# Patient Record
Sex: MALE | Race: WHITE
[De-identification: names, ages, dates, MRNs, and addresses within clinical notes are randomized per-mention and may not be internally consistent; named-entity substitution may affect disease eponyms.]

---

## 2021-08-02 ENCOUNTER — HOSPITAL ENCOUNTER (EMERGENCY)
Dept: HOSPITAL 95 - ER | Age: 81
LOS: 2 days | Discharge: LEFT BEFORE DISCHARGE | End: 2021-08-04
Payer: MEDICARE

## 2021-08-02 DIAGNOSIS — Z53.21: Primary | ICD-10-CM

## 2021-08-03 ENCOUNTER — HOSPITAL ENCOUNTER (OUTPATIENT)
Dept: HOSPITAL 95 - LAB | Age: 81
Discharge: HOME | End: 2021-08-03
Attending: PODIATRIST
Payer: MEDICARE

## 2021-08-03 DIAGNOSIS — L97.509: Primary | ICD-10-CM

## 2021-08-16 ENCOUNTER — HOSPITAL ENCOUNTER (OUTPATIENT)
Dept: HOSPITAL 95 - LAB | Age: 81
Discharge: HOME | End: 2021-08-16
Attending: PODIATRIST
Payer: MEDICARE

## 2021-08-16 DIAGNOSIS — M86.172: Primary | ICD-10-CM

## 2021-08-16 DIAGNOSIS — L03.032: ICD-10-CM

## 2021-08-16 DIAGNOSIS — M86.9: Primary | ICD-10-CM

## 2021-08-16 DIAGNOSIS — L98.499: ICD-10-CM

## 2021-09-30 ENCOUNTER — HOSPITAL ENCOUNTER (OUTPATIENT)
Dept: HOSPITAL 95 - LAB SHORT | Age: 81
Discharge: HOME | End: 2021-09-30
Attending: PODIATRIST
Payer: MEDICARE

## 2021-09-30 DIAGNOSIS — T81.31XD: ICD-10-CM

## 2021-09-30 DIAGNOSIS — Z48.89: Primary | ICD-10-CM

## 2021-09-30 DIAGNOSIS — R20.0: ICD-10-CM

## 2021-09-30 DIAGNOSIS — E11.621: ICD-10-CM

## 2021-09-30 DIAGNOSIS — L97.509: ICD-10-CM

## 2021-10-22 ENCOUNTER — HOSPITAL ENCOUNTER (OUTPATIENT)
Dept: HOSPITAL 95 - LAB SHORT | Age: 81
Discharge: HOME | End: 2021-10-22
Attending: PODIATRIST
Payer: MEDICARE

## 2021-10-22 DIAGNOSIS — Z48.89: Primary | ICD-10-CM

## 2021-11-01 ENCOUNTER — HOSPITAL ENCOUNTER (OUTPATIENT)
Dept: HOSPITAL 95 - LAB | Age: 81
End: 2021-11-01
Attending: PODIATRIST
Payer: MEDICARE

## 2021-11-01 ENCOUNTER — HOSPITAL ENCOUNTER (EMERGENCY)
Dept: HOSPITAL 95 - ER | Age: 81
Discharge: HOME | End: 2021-11-01
Payer: MEDICARE

## 2021-11-01 VITALS — HEIGHT: 76 IN | BODY MASS INDEX: 23.52 KG/M2 | WEIGHT: 193.12 LBS

## 2021-11-01 DIAGNOSIS — I48.91: ICD-10-CM

## 2021-11-01 DIAGNOSIS — M86.9: ICD-10-CM

## 2021-11-01 DIAGNOSIS — E11.69: Primary | ICD-10-CM

## 2021-11-01 DIAGNOSIS — Z48.817: Primary | ICD-10-CM

## 2021-11-01 LAB
ALBUMIN SERPL BCP-MCNC: 2.7 G/DL (ref 3.4–5)
ALBUMIN/GLOB SERPL: 0.6 {RATIO} (ref 0.8–1.8)
ALT SERPL W P-5'-P-CCNC: 16 U/L (ref 12–78)
ANION GAP SERPL CALCULATED.4IONS-SCNC: 3 MMOL/L (ref 6–16)
AST SERPL W P-5'-P-CCNC: 23 U/L (ref 12–37)
BASOPHILS # BLD AUTO: 0.11 K/MM3 (ref 0–0.23)
BASOPHILS NFR BLD AUTO: 1 % (ref 0–2)
BILIRUB SERPL-MCNC: 0.6 MG/DL (ref 0.1–1)
BUN SERPL-MCNC: 20 MG/DL (ref 8–24)
CALCIUM SERPL-MCNC: 9.1 MG/DL (ref 8.5–10.1)
CHLORIDE SERPL-SCNC: 105 MMOL/L (ref 98–108)
CO2 SERPL-SCNC: 30 MMOL/L (ref 21–32)
CREAT SERPL-MCNC: 0.77 MG/DL (ref 0.6–1.2)
DEPRECATED RDW RBC AUTO: 47.3 FL (ref 35.1–46.3)
EOSINOPHIL # BLD AUTO: 0.11 K/MM3 (ref 0–0.68)
EOSINOPHIL NFR BLD AUTO: 1 % (ref 0–6)
ERYTHROCYTE [DISTWIDTH] IN BLOOD BY AUTOMATED COUNT: 14.7 % (ref 11.7–14.2)
GLOBULIN SER CALC-MCNC: 4.4 G/DL (ref 2.2–4)
GLUCOSE SERPL-MCNC: 129 MG/DL (ref 70–99)
HCT VFR BLD AUTO: 40.4 % (ref 37–53)
HGB BLD-MCNC: 12.6 G/DL (ref 13.5–17.5)
IMM GRANULOCYTES # BLD AUTO: 0.04 K/MM3 (ref 0–0.1)
IMM GRANULOCYTES NFR BLD AUTO: 0 % (ref 0–1)
LYMPHOCYTES # BLD AUTO: 1.1 K/MM3 (ref 0.84–5.2)
LYMPHOCYTES NFR BLD AUTO: 10 % (ref 21–46)
MCHC RBC AUTO-ENTMCNC: 31.2 G/DL (ref 31.5–36.5)
MCV RBC AUTO: 87 FL (ref 80–100)
MONOCYTES # BLD AUTO: 1.08 K/MM3 (ref 0.16–1.47)
MONOCYTES NFR BLD AUTO: 10 % (ref 4–13)
NEUTROPHILS # BLD AUTO: 8.93 K/MM3 (ref 1.96–9.15)
NEUTROPHILS NFR BLD AUTO: 78 % (ref 41–73)
NRBC # BLD AUTO: 0 K/MM3 (ref 0–0.02)
NRBC BLD AUTO-RTO: 0 /100 WBC (ref 0–0.2)
PLATELET # BLD AUTO: 308 K/MM3 (ref 150–400)
POTASSIUM SERPL-SCNC: 4.3 MMOL/L (ref 3.5–5.5)
PROT SERPL-MCNC: 7.1 G/DL (ref 6.4–8.2)
SODIUM SERPL-SCNC: 138 MMOL/L (ref 136–145)

## 2021-11-01 PROCEDURE — A9270 NON-COVERED ITEM OR SERVICE: HCPCS

## 2021-11-02 ENCOUNTER — HOSPITAL ENCOUNTER (OUTPATIENT)
Dept: HOSPITAL 95 - ATC | Age: 81
Discharge: HOME | End: 2021-11-02
Attending: FAMILY MEDICINE
Payer: MEDICARE

## 2021-11-02 DIAGNOSIS — E11.69: Primary | ICD-10-CM

## 2021-11-02 DIAGNOSIS — I10: ICD-10-CM

## 2021-11-02 DIAGNOSIS — Z79.899: ICD-10-CM

## 2021-11-02 DIAGNOSIS — M86.8X7: ICD-10-CM

## 2021-11-02 PROCEDURE — C1751 CATH, INF, PER/CENT/MIDLINE: HCPCS

## 2021-11-02 NOTE — NUR
F/U CHEST X-RAY NEEDED:
CHEST X-RAY WAS PERFORMED PER PROTOCOL FOR CONFIRMATION OF PICC PLACEMENT IN
PT W/ AFIB.  PER RADIOLOGIST THE PICC  TIP PROJECTED OVER THE INF R ATRIUM.
REPORT WAS RECEIVED FROM DEISI BRANDON THAT THE RADIOLOGIST HAD READ THE CHEST
XRAY AND TO PULL THE CATHETER BACK 3 CM.  THERE WAS NO MENTION OF GETTING A
REPEAT CHEST XRAY AT THAT TIME.
THIS RN PULLED THE CATHETER BACK 3 CM FOR A TOTAL OF 6 CM EXPOSED. PT WAS THEN
DC'D HOME.
WHEN THE XRAY REPORT WAS AVAILABE, THIS RN NOTICED THAT THE RADIOLOGIST WROTE
"SUBSEQUENT IMAGING TO VERIFY PLACEMENT".  PT WAS CONTACTED, HE SAID HE WOULD
RETURN TO THE HOSPITAL FOR A PREVIOUSLY SCHEDULED APPT, SO HE COULD GET
THE X-RAY THEN.  THE PT LATER CALLED THIS RN TO SAY THAT HIS FOOT WAS SORE
AND THAT HE HAD CANCELLED HIS OTHER APPT SO HE WOULD NOT BE COMING BACK FOR
THE X-RAY.  PT WAS TOLD THAT IT WAS IMPORTANT TO GET THE XRAY, HE VERBALIZED
UNDERSTANDING AND SAID HE WOULD RETURN TOMORROW AM FOR HIS INFUSION AND XRAY
AT THAT TIME.
THE PICC WAS NOT USED FOR INFUSION BECAUSE HE HAD AN IV WHICH WAS USED.

## 2021-11-03 ENCOUNTER — HOSPITAL ENCOUNTER (OUTPATIENT)
Dept: HOSPITAL 95 - ATC | Age: 81
Discharge: HOME | End: 2021-11-03
Attending: FAMILY MEDICINE
Payer: MEDICARE

## 2021-11-03 DIAGNOSIS — E11.69: Primary | ICD-10-CM

## 2021-11-03 DIAGNOSIS — M86.9: ICD-10-CM

## 2021-11-03 DIAGNOSIS — I48.91: ICD-10-CM

## 2021-11-03 LAB
CREAT SERPL-MCNC: 0.74 MG/DL (ref 0.6–1.2)
GENTAMICIN TROUGH SERPL-MCNC: 1.4 UG/ML (ref 0–1.9)
VANCOMYCIN TROUGH SERPL-MCNC: 6.9 UG/ML (ref 5–10)

## 2021-11-04 ENCOUNTER — HOSPITAL ENCOUNTER (OUTPATIENT)
Dept: HOSPITAL 95 - ATC | Age: 81
Discharge: HOME | End: 2021-11-04
Attending: FAMILY MEDICINE
Payer: MEDICARE

## 2021-11-04 DIAGNOSIS — E11.69: Primary | ICD-10-CM

## 2021-11-04 DIAGNOSIS — I48.91: ICD-10-CM

## 2021-11-04 DIAGNOSIS — M86.9: ICD-10-CM

## 2021-11-04 LAB — GENTAMICIN TROUGH SERPL-MCNC: 0.2 UG/ML (ref 0–1.9)

## 2021-11-05 ENCOUNTER — HOSPITAL ENCOUNTER (OUTPATIENT)
Dept: HOSPITAL 95 - ATC | Age: 81
Discharge: HOME | End: 2021-11-05
Attending: FAMILY MEDICINE
Payer: MEDICARE

## 2021-11-05 DIAGNOSIS — Z89.421: ICD-10-CM

## 2021-11-05 DIAGNOSIS — E11.69: Primary | ICD-10-CM

## 2021-11-05 DIAGNOSIS — Z79.899: ICD-10-CM

## 2021-11-05 DIAGNOSIS — M86.8X7: ICD-10-CM

## 2021-11-06 ENCOUNTER — HOSPITAL ENCOUNTER (OUTPATIENT)
Dept: HOSPITAL 95 - ATC | Age: 81
Discharge: HOME | End: 2021-11-06
Attending: FAMILY MEDICINE
Payer: MEDICARE

## 2021-11-06 DIAGNOSIS — I48.91: ICD-10-CM

## 2021-11-06 DIAGNOSIS — E11.69: Primary | ICD-10-CM

## 2021-11-06 DIAGNOSIS — M86.9: ICD-10-CM

## 2021-11-06 LAB
CREAT SERPL-MCNC: 0.75 MG/DL (ref 0.6–1.2)
GENTAMICIN TROUGH SERPL-MCNC: 0.4 UG/ML (ref 0–1.9)
VANCOMYCIN TROUGH SERPL-MCNC: 14.3 UG/ML (ref 5–10)

## 2021-11-07 ENCOUNTER — HOSPITAL ENCOUNTER (OUTPATIENT)
Dept: HOSPITAL 95 - ATC | Age: 81
Discharge: HOME | End: 2021-11-07
Attending: FAMILY MEDICINE
Payer: MEDICARE

## 2021-11-07 DIAGNOSIS — M86.9: ICD-10-CM

## 2021-11-07 DIAGNOSIS — I48.91: ICD-10-CM

## 2021-11-07 DIAGNOSIS — E11.69: Primary | ICD-10-CM

## 2021-11-08 ENCOUNTER — HOSPITAL ENCOUNTER (OUTPATIENT)
Dept: HOSPITAL 95 - ATC | Age: 81
Discharge: HOME | End: 2021-11-08
Attending: FAMILY MEDICINE
Payer: MEDICARE

## 2021-11-08 DIAGNOSIS — E11.69: Primary | ICD-10-CM

## 2021-11-08 DIAGNOSIS — Z89.421: ICD-10-CM

## 2021-11-08 DIAGNOSIS — M86.8X7: ICD-10-CM

## 2021-11-08 DIAGNOSIS — Z79.899: ICD-10-CM

## 2021-11-09 ENCOUNTER — HOSPITAL ENCOUNTER (OUTPATIENT)
Dept: HOSPITAL 95 - ATC | Age: 81
Discharge: HOME | End: 2021-11-09
Attending: FAMILY MEDICINE
Payer: MEDICARE

## 2021-11-09 DIAGNOSIS — E11.69: Primary | ICD-10-CM

## 2021-11-09 DIAGNOSIS — Z79.899: ICD-10-CM

## 2021-11-09 DIAGNOSIS — M86.8X7: ICD-10-CM

## 2021-11-10 ENCOUNTER — HOSPITAL ENCOUNTER (OUTPATIENT)
Dept: HOSPITAL 95 - ATC | Age: 81
Discharge: HOME | End: 2021-11-10
Attending: FAMILY MEDICINE
Payer: MEDICARE

## 2021-11-10 DIAGNOSIS — I48.91: ICD-10-CM

## 2021-11-10 DIAGNOSIS — M86.9: ICD-10-CM

## 2021-11-10 DIAGNOSIS — Z79.84: ICD-10-CM

## 2021-11-10 DIAGNOSIS — E11.69: Primary | ICD-10-CM

## 2021-11-10 LAB
CREAT SERPL-MCNC: 0.83 MG/DL (ref 0.6–1.2)
GENTAMICIN TROUGH SERPL-MCNC: 0.4 UG/ML (ref 0–1.9)
VANCOMYCIN TROUGH SERPL-MCNC: 18.1 UG/ML (ref 5–10)

## 2021-11-11 ENCOUNTER — HOSPITAL ENCOUNTER (OUTPATIENT)
Dept: HOSPITAL 95 - ATC | Age: 81
Discharge: HOME | End: 2021-11-11
Attending: FAMILY MEDICINE
Payer: MEDICARE

## 2021-11-11 DIAGNOSIS — I48.91: ICD-10-CM

## 2021-11-11 DIAGNOSIS — M86.9: ICD-10-CM

## 2021-11-11 DIAGNOSIS — E11.69: Primary | ICD-10-CM

## 2021-11-14 ENCOUNTER — HOSPITAL ENCOUNTER (OUTPATIENT)
Dept: HOSPITAL 95 - ATC | Age: 81
Discharge: HOME | End: 2021-11-14
Attending: FAMILY MEDICINE
Payer: MEDICARE

## 2021-11-14 DIAGNOSIS — E11.622: ICD-10-CM

## 2021-11-14 DIAGNOSIS — I83.229: ICD-10-CM

## 2021-11-14 DIAGNOSIS — Z01.810: ICD-10-CM

## 2021-11-14 DIAGNOSIS — I70.213: ICD-10-CM

## 2021-11-14 DIAGNOSIS — M86.9: ICD-10-CM

## 2021-11-14 DIAGNOSIS — E11.69: Primary | ICD-10-CM

## 2021-11-14 DIAGNOSIS — L97.919: ICD-10-CM

## 2021-11-14 DIAGNOSIS — L97.929: ICD-10-CM

## 2021-11-14 DIAGNOSIS — I63.219: ICD-10-CM

## 2021-11-15 ENCOUNTER — HOSPITAL ENCOUNTER (OUTPATIENT)
Dept: HOSPITAL 95 - MHTC | Age: 81
Discharge: HOME | End: 2021-11-15
Attending: RADIOLOGY
Payer: MEDICARE

## 2021-11-15 VITALS — HEIGHT: 76 IN | WEIGHT: 197.98 LBS | BODY MASS INDEX: 24.11 KG/M2

## 2021-11-15 DIAGNOSIS — J44.9: ICD-10-CM

## 2021-11-15 DIAGNOSIS — L97.919: ICD-10-CM

## 2021-11-15 DIAGNOSIS — I48.91: ICD-10-CM

## 2021-11-15 DIAGNOSIS — I50.9: ICD-10-CM

## 2021-11-15 DIAGNOSIS — E11.51: Primary | ICD-10-CM

## 2021-11-15 DIAGNOSIS — I70.212: ICD-10-CM

## 2021-11-15 DIAGNOSIS — K21.9: ICD-10-CM

## 2021-11-15 DIAGNOSIS — L97.929: ICD-10-CM

## 2021-11-15 PROCEDURE — C1725 CATH, TRANSLUMIN NON-LASER: HCPCS

## 2021-11-15 PROCEDURE — C1894 INTRO/SHEATH, NON-LASER: HCPCS

## 2021-11-15 PROCEDURE — A9270 NON-COVERED ITEM OR SERVICE: HCPCS

## 2021-11-15 PROCEDURE — C1760 CLOSURE DEV, VASC: HCPCS

## 2021-11-15 PROCEDURE — C1887 CATHETER, GUIDING: HCPCS

## 2021-11-15 PROCEDURE — C1769 GUIDE WIRE: HCPCS

## 2021-11-15 NOTE — NUR
1030 PATIENT BROUGHT BACK TO THE PRE OP/RECOVERY ROOM TO PREP FOR PROCEDURE.
ALLOWED TO MAINTAIN IN CLOTHES TO STAY WARM.  COMPUTER ADMISSION CRITERIA
PERFROMED. WILL START PIV CLOSER TO THE BEGINING OF HIS PROCEDURE START TIME.

## 2021-11-15 NOTE — NUR
PATIENT RETURNED FROM THE [facility] POST PROCEDURE AND DR. HAMM AT THE
BEDSIDE. PLACED ON THE MONITOR. RIGHT GROIN ANTEGRADE STICK.  DR. STOVER AT
THE BEDSIDE AND SPOKE WITH THE PATIENT AT LENGTH.  NEW ORDERS NOTED.

## 2021-11-15 NOTE — NUR
PATIENT UP AND PIV REMOVED.  VOIDED 300 UOP NOTED. DRESSED AND REVEIEWED
DISCAHRGE INSTRUCTIONS WITH THE PATIENT.  SIGNATURES GATHERED AND COPIES GIVEN
TO THE PATIENT. FOLLOW APPOINTMENT MADE AND WILL HAVE LABS AND ULTRASOUND
SCHEDULED PRIOR TO FOLLOW UP.

## 2021-11-16 ENCOUNTER — HOSPITAL ENCOUNTER (OUTPATIENT)
Dept: HOSPITAL 95 - ATC | Age: 81
Discharge: HOME | End: 2021-11-16
Attending: FAMILY MEDICINE
Payer: MEDICARE

## 2021-11-16 DIAGNOSIS — M86.9: Primary | ICD-10-CM

## 2021-11-16 LAB
CREAT SERPL-MCNC: 0.95 MG/DL (ref 0.6–1.2)
GENTAMICIN TROUGH SERPL-MCNC: 3.1 UG/ML (ref 0–1.9)
VANCOMYCIN TROUGH SERPL-MCNC: 15.7 UG/ML (ref 5–10)

## 2021-11-18 ENCOUNTER — HOSPITAL ENCOUNTER (OUTPATIENT)
Dept: HOSPITAL 95 - ATC | Age: 81
Discharge: HOME | End: 2021-11-18
Attending: FAMILY MEDICINE
Payer: MEDICARE

## 2021-11-18 DIAGNOSIS — E11.69: Primary | ICD-10-CM

## 2021-11-18 DIAGNOSIS — I48.91: ICD-10-CM

## 2021-11-18 DIAGNOSIS — M86.9: ICD-10-CM

## 2021-11-19 ENCOUNTER — HOSPITAL ENCOUNTER (OUTPATIENT)
Dept: HOSPITAL 95 - ATC | Age: 81
Discharge: HOME | End: 2021-11-19
Attending: FAMILY MEDICINE
Payer: MEDICARE

## 2021-11-19 DIAGNOSIS — M86.9: ICD-10-CM

## 2021-11-19 DIAGNOSIS — E11.69: Primary | ICD-10-CM

## 2021-11-20 ENCOUNTER — HOSPITAL ENCOUNTER (OUTPATIENT)
Dept: HOSPITAL 95 - ATC | Age: 81
Discharge: HOME | End: 2021-11-20
Attending: FAMILY MEDICINE
Payer: MEDICARE

## 2021-11-20 DIAGNOSIS — E11.69: Primary | ICD-10-CM

## 2021-11-20 DIAGNOSIS — M86.9: ICD-10-CM

## 2021-11-20 LAB
CREAT SERPL-MCNC: 1.16 MG/DL (ref 0.6–1.2)
GENTAMICIN TROUGH SERPL-MCNC: 0.3 UG/ML (ref 0–1.9)
VANCOMYCIN TROUGH SERPL-MCNC: 20.5 UG/ML (ref 5–10)

## 2021-11-22 ENCOUNTER — HOSPITAL ENCOUNTER (OUTPATIENT)
Dept: HOSPITAL 95 - ATC | Age: 81
Discharge: HOME | End: 2021-11-22
Attending: FAMILY MEDICINE
Payer: MEDICARE

## 2021-11-22 ENCOUNTER — HOSPITAL ENCOUNTER (OUTPATIENT)
Dept: HOSPITAL 95 - WOUND | Age: 81
Discharge: HOME | End: 2021-11-22
Attending: SURGERY
Payer: MEDICARE

## 2021-11-22 DIAGNOSIS — M86.9: ICD-10-CM

## 2021-11-22 DIAGNOSIS — E11.622: ICD-10-CM

## 2021-11-22 DIAGNOSIS — E11.59: ICD-10-CM

## 2021-11-22 DIAGNOSIS — L97.516: ICD-10-CM

## 2021-11-22 DIAGNOSIS — E11.621: Primary | ICD-10-CM

## 2021-11-22 DIAGNOSIS — E11.42: ICD-10-CM

## 2021-11-22 DIAGNOSIS — L97.822: ICD-10-CM

## 2021-11-22 DIAGNOSIS — I87.2: ICD-10-CM

## 2021-11-22 DIAGNOSIS — E11.69: Primary | ICD-10-CM

## 2021-11-22 DIAGNOSIS — E11.51: ICD-10-CM

## 2021-11-22 DIAGNOSIS — I48.91: ICD-10-CM

## 2021-11-22 LAB
CREAT SERPL-MCNC: 1.45 MG/DL (ref 0.6–1.2)
GENTAMICIN SERPL-MCNC: 1.6 UG/ML
VANCOMYCIN SERPL-MCNC: 14.8 UG/ML

## 2021-11-22 PROCEDURE — G0463 HOSPITAL OUTPT CLINIC VISIT: HCPCS

## 2021-11-23 ENCOUNTER — HOSPITAL ENCOUNTER (OUTPATIENT)
Dept: HOSPITAL 95 - LAB SHORT | Age: 81
End: 2021-11-23
Attending: PODIATRIST
Payer: MEDICARE

## 2021-11-23 DIAGNOSIS — Z48.89: Primary | ICD-10-CM

## 2021-11-23 DIAGNOSIS — T81.31XD: ICD-10-CM

## 2021-11-23 DIAGNOSIS — E11.42: ICD-10-CM

## 2021-11-23 DIAGNOSIS — L97.509: ICD-10-CM

## 2021-11-23 DIAGNOSIS — R20.0: ICD-10-CM

## 2022-01-11 ENCOUNTER — HOSPITAL ENCOUNTER (EMERGENCY)
Dept: HOSPITAL 95 - ER | Age: 82
Discharge: HOME | End: 2022-01-11
Payer: MEDICARE

## 2022-01-11 VITALS — HEIGHT: 76 IN | WEIGHT: 188.01 LBS | BODY MASS INDEX: 22.89 KG/M2

## 2022-01-11 DIAGNOSIS — F17.210: ICD-10-CM

## 2022-01-11 DIAGNOSIS — S16.1XXA: ICD-10-CM
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
## 2022-01-11 DIAGNOSIS — S51.811A: ICD-10-CM

## 2022-01-11 DIAGNOSIS — S40.012A: ICD-10-CM

## 2022-01-11 DIAGNOSIS — I48.91: ICD-10-CM

## 2022-01-11 DIAGNOSIS — S50.12XA: ICD-10-CM

## 2022-01-11 DIAGNOSIS — E11.9: ICD-10-CM

## 2022-01-11 DIAGNOSIS — S01.81XA: Primary | ICD-10-CM

## 2022-01-11 PROCEDURE — L0160 CERV SR WIRE OCC/MAN PRE OTS: HCPCS

## 2022-01-31 ENCOUNTER — HOSPITAL ENCOUNTER (OUTPATIENT)
Dept: HOSPITAL 95 - LAB SHORT | Age: 82
Discharge: HOME | End: 2022-01-31
Attending: PODIATRIST
Payer: MEDICARE

## 2022-01-31 DIAGNOSIS — M86.171: Primary | ICD-10-CM

## 2022-02-01 ENCOUNTER — HOSPITAL ENCOUNTER (OUTPATIENT)
Dept: HOSPITAL 95 - LAB | Age: 82
End: 2022-02-01
Attending: PODIATRIST
Payer: MEDICARE

## 2022-02-01 DIAGNOSIS — L02.611: Primary | ICD-10-CM

## 2022-04-04 ENCOUNTER — HOSPITAL ENCOUNTER (EMERGENCY)
Dept: HOSPITAL 95 - ER | Age: 82
Discharge: HOME | End: 2022-04-04
Payer: MEDICARE

## 2022-04-04 ENCOUNTER — HOSPITAL ENCOUNTER (OUTPATIENT)
Dept: HOSPITAL 95 - LAB SHORT | Age: 82
Discharge: HOME | End: 2022-04-04
Attending: PODIATRIST
Payer: MEDICARE

## 2022-04-04 VITALS — HEIGHT: 76 IN | WEIGHT: 192 LBS | BODY MASS INDEX: 23.38 KG/M2

## 2022-04-04 DIAGNOSIS — Z79.899: ICD-10-CM

## 2022-04-04 DIAGNOSIS — J18.9: ICD-10-CM

## 2022-04-04 DIAGNOSIS — Z87.891: ICD-10-CM

## 2022-04-04 DIAGNOSIS — I48.91: ICD-10-CM

## 2022-04-04 DIAGNOSIS — E11.9: ICD-10-CM

## 2022-04-04 DIAGNOSIS — N39.0: Primary | ICD-10-CM

## 2022-04-04 DIAGNOSIS — L02.611: Primary | ICD-10-CM

## 2022-04-04 DIAGNOSIS — I50.9: ICD-10-CM

## 2022-04-04 LAB
ALBUMIN SERPL BCP-MCNC: 3.5 G/DL (ref 3.4–5)
ALBUMIN/GLOB SERPL: 0.7 {RATIO} (ref 0.8–1.8)
ALT SERPL W P-5'-P-CCNC: 16 U/L (ref 12–78)
ANION GAP SERPL CALCULATED.4IONS-SCNC: 4 MMOL/L (ref 6–16)
AST SERPL W P-5'-P-CCNC: 23 U/L (ref 12–37)
BASOPHILS # BLD AUTO: 0.11 K/MM3 (ref 0–0.23)
BASOPHILS NFR BLD AUTO: 1 % (ref 0–2)
BILIRUB SERPL-MCNC: 0.4 MG/DL (ref 0.1–1)
BUN SERPL-MCNC: 38 MG/DL (ref 8–24)
CALCIUM SERPL-MCNC: 9.1 MG/DL (ref 8.5–10.1)
CHLORIDE SERPL-SCNC: 106 MMOL/L (ref 98–108)
CO2 SERPL-SCNC: 28 MMOL/L (ref 21–32)
CREAT SERPL-MCNC: 1.47 MG/DL (ref 0.6–1.2)
DEPRECATED RDW RBC AUTO: 59.5 FL (ref 35.1–46.3)
EOSINOPHIL # BLD AUTO: 0.58 K/MM3 (ref 0–0.68)
EOSINOPHIL NFR BLD AUTO: 7 % (ref 0–6)
ERYTHROCYTE [DISTWIDTH] IN BLOOD BY AUTOMATED COUNT: 17.7 % (ref 11.7–14.2)
GLOBULIN SER CALC-MCNC: 4.9 G/DL (ref 2.2–4)
GLUCOSE SERPL-MCNC: 139 MG/DL (ref 70–99)
HCT VFR BLD AUTO: 37.7 % (ref 37–53)
HGB BLD-MCNC: 11 G/DL (ref 13.5–17.5)
IMM GRANULOCYTES # BLD AUTO: 0.03 K/MM3 (ref 0–0.1)
IMM GRANULOCYTES NFR BLD AUTO: 0 % (ref 0–1)
LEUKOCYTE ESTERASE UR QL STRIP: (no result)
LYMPHOCYTES # BLD AUTO: 1.05 K/MM3 (ref 0.84–5.2)
LYMPHOCYTES NFR BLD AUTO: 12 % (ref 21–46)
MCHC RBC AUTO-ENTMCNC: 29.2 G/DL (ref 31.5–36.5)
MCV RBC AUTO: 91 FL (ref 80–100)
MONOCYTES # BLD AUTO: 0.79 K/MM3 (ref 0.16–1.47)
MONOCYTES NFR BLD AUTO: 9 % (ref 4–13)
NEUTROPHILS # BLD AUTO: 6.01 K/MM3 (ref 1.96–9.15)
NEUTROPHILS NFR BLD AUTO: 70 % (ref 41–73)
NRBC # BLD AUTO: 0 K/MM3 (ref 0–0.02)
NRBC BLD AUTO-RTO: 0 /100 WBC (ref 0–0.2)
PLATELET # BLD AUTO: 195 K/MM3 (ref 150–400)
POTASSIUM SERPL-SCNC: 4.5 MMOL/L (ref 3.5–5.5)
PROT SERPL-MCNC: 8.4 G/DL (ref 6.4–8.2)
RBC #/AREA URNS HPF: (no result) /HPF (ref 0–2)
SODIUM SERPL-SCNC: 138 MMOL/L (ref 136–145)
SP GR SPEC: 1.02 (ref 1–1.02)
UROBILINOGEN UR STRIP-MCNC: (no result) MG/DL
WBC #/AREA URNS HPF: (no result) /HPF (ref 0–5)

## 2022-04-25 ENCOUNTER — HOSPITAL ENCOUNTER (OUTPATIENT)
Dept: HOSPITAL 95 - LAB UVN | Age: 82
Discharge: HOME | End: 2022-04-25
Attending: INTERNAL MEDICINE
Payer: MEDICARE

## 2022-04-25 DIAGNOSIS — N39.0: Primary | ICD-10-CM

## 2022-04-25 LAB
SP GR SPEC: 1.01 (ref 1–1.02)
UROBILINOGEN UR STRIP-MCNC: (no result) MG/DL

## 2022-06-19 ENCOUNTER — HOSPITAL ENCOUNTER (EMERGENCY)
Dept: HOSPITAL 95 - ER | Age: 82
Discharge: HOME | End: 2022-06-19
Payer: MEDICARE

## 2022-06-19 VITALS — HEIGHT: 76 IN | WEIGHT: 200 LBS | BODY MASS INDEX: 24.36 KG/M2

## 2022-06-19 DIAGNOSIS — J44.9: ICD-10-CM

## 2022-06-19 DIAGNOSIS — E11.40: ICD-10-CM

## 2022-06-19 DIAGNOSIS — I50.9: Primary | ICD-10-CM

## 2022-06-19 DIAGNOSIS — Z79.84: ICD-10-CM

## 2022-06-19 DIAGNOSIS — Z79.02: ICD-10-CM

## 2022-06-19 DIAGNOSIS — Z79.899: ICD-10-CM

## 2022-06-19 LAB
ALBUMIN SERPL BCP-MCNC: 3.5 G/DL (ref 3.4–5)
ALBUMIN/GLOB SERPL: 0.8 {RATIO} (ref 0.8–1.8)
ALT SERPL W P-5'-P-CCNC: 15 U/L (ref 12–78)
ANION GAP SERPL CALCULATED.4IONS-SCNC: 8 MMOL/L (ref 6–16)
AST SERPL W P-5'-P-CCNC: 17 U/L (ref 12–37)
BASOPHILS # BLD AUTO: 0.05 K/MM3 (ref 0–0.23)
BASOPHILS NFR BLD AUTO: 1 % (ref 0–2)
BILIRUB SERPL-MCNC: 0.5 MG/DL (ref 0.1–1)
BUN SERPL-MCNC: 28 MG/DL (ref 8–24)
CALCIUM SERPL-MCNC: 9.1 MG/DL (ref 8.5–10.1)
CHLORIDE SERPL-SCNC: 102 MMOL/L (ref 98–108)
CO2 SERPL-SCNC: 28 MMOL/L (ref 21–32)
CREAT SERPL-MCNC: 1.37 MG/DL (ref 0.6–1.2)
DEPRECATED RDW RBC AUTO: 45.1 FL (ref 35.1–46.3)
EOSINOPHIL # BLD AUTO: 0.24 K/MM3 (ref 0–0.68)
EOSINOPHIL NFR BLD AUTO: 4 % (ref 0–6)
ERYTHROCYTE [DISTWIDTH] IN BLOOD BY AUTOMATED COUNT: 15.7 % (ref 11.7–14.2)
GLOBULIN SER CALC-MCNC: 4.4 G/DL (ref 2.2–4)
GLUCOSE SERPL-MCNC: 144 MG/DL (ref 70–99)
HCT VFR BLD AUTO: 34.4 % (ref 37–53)
HGB BLD-MCNC: 10.9 G/DL (ref 13.5–17.5)
IMM GRANULOCYTES # BLD AUTO: 0.02 K/MM3 (ref 0–0.1)
IMM GRANULOCYTES NFR BLD AUTO: 0 % (ref 0–1)
LYMPHOCYTES # BLD AUTO: 0.91 K/MM3 (ref 0.84–5.2)
LYMPHOCYTES NFR BLD AUTO: 15 % (ref 21–46)
MCHC RBC AUTO-ENTMCNC: 31.7 G/DL (ref 31.5–36.5)
MCV RBC AUTO: 79 FL (ref 80–100)
MONOCYTES # BLD AUTO: 0.54 K/MM3 (ref 0.16–1.47)
MONOCYTES NFR BLD AUTO: 9 % (ref 4–13)
NEUTROPHILS # BLD AUTO: 4.2 K/MM3 (ref 1.96–9.15)
NEUTROPHILS NFR BLD AUTO: 71 % (ref 41–73)
NRBC # BLD AUTO: 0 K/MM3 (ref 0–0.02)
NRBC BLD AUTO-RTO: 0 /100 WBC (ref 0–0.2)
PLATELET # BLD AUTO: 173 K/MM3 (ref 150–400)
POTASSIUM SERPL-SCNC: 4.1 MMOL/L (ref 3.5–5.5)
PROT SERPL-MCNC: 7.9 G/DL (ref 6.4–8.2)
SODIUM SERPL-SCNC: 138 MMOL/L (ref 136–145)

## 2022-09-26 ENCOUNTER — HOSPITAL ENCOUNTER (OUTPATIENT)
Dept: HOSPITAL 95 - LAB | Age: 82
End: 2022-09-26
Attending: PODIATRIST
Payer: COMMERCIAL

## 2022-09-26 DIAGNOSIS — M86.8X7: Primary | ICD-10-CM

## 2022-10-10 ENCOUNTER — HOSPITAL ENCOUNTER (OUTPATIENT)
Dept: HOSPITAL 95 - WOUND | Age: 82
Discharge: HOME | End: 2022-10-10
Attending: SURGERY
Payer: COMMERCIAL

## 2022-10-10 DIAGNOSIS — L89.323: Primary | ICD-10-CM

## 2022-10-10 DIAGNOSIS — Z99.3: ICD-10-CM

## 2022-10-10 PROCEDURE — G0463 HOSPITAL OUTPT CLINIC VISIT: HCPCS

## 2022-10-11 ENCOUNTER — HOSPITAL ENCOUNTER (OUTPATIENT)
Dept: HOSPITAL 95 - ER | Age: 82
Setting detail: OBSERVATION
LOS: 2 days | Discharge: NURSING, REHAB OR LONG-TERM CARE | End: 2022-10-13
Attending: INTERNAL MEDICINE | Admitting: HOSPITALIST
Payer: COMMERCIAL

## 2022-10-11 VITALS — HEIGHT: 76 IN | BODY MASS INDEX: 25.16 KG/M2 | WEIGHT: 206.57 LBS

## 2022-10-11 DIAGNOSIS — I48.91: ICD-10-CM

## 2022-10-11 DIAGNOSIS — Z89.511: ICD-10-CM

## 2022-10-11 DIAGNOSIS — J44.9: ICD-10-CM

## 2022-10-11 DIAGNOSIS — Z79.02: ICD-10-CM

## 2022-10-11 DIAGNOSIS — L89.329: ICD-10-CM

## 2022-10-11 DIAGNOSIS — N39.0: ICD-10-CM

## 2022-10-11 DIAGNOSIS — I50.23: Primary | ICD-10-CM

## 2022-10-11 DIAGNOSIS — E11.40: ICD-10-CM

## 2022-10-11 DIAGNOSIS — Z66: ICD-10-CM

## 2022-10-11 DIAGNOSIS — F17.210: ICD-10-CM

## 2022-10-11 DIAGNOSIS — N18.30: ICD-10-CM

## 2022-10-11 DIAGNOSIS — N40.0: ICD-10-CM

## 2022-10-11 DIAGNOSIS — K21.9: ICD-10-CM

## 2022-10-11 DIAGNOSIS — Z20.822: ICD-10-CM

## 2022-10-11 DIAGNOSIS — E11.51: ICD-10-CM

## 2022-10-11 LAB
ALBUMIN SERPL BCP-MCNC: 3.2 G/DL (ref 3.4–5)
ALBUMIN/GLOB SERPL: 0.8 {RATIO} (ref 0.8–1.8)
ALT SERPL W P-5'-P-CCNC: 12 U/L (ref 12–78)
ANION GAP SERPL CALCULATED.4IONS-SCNC: 5 MMOL/L (ref 6–16)
AST SERPL W P-5'-P-CCNC: 22 U/L (ref 12–37)
BASOPHILS # BLD AUTO: 0.05 K/MM3 (ref 0–0.23)
BASOPHILS NFR BLD AUTO: 1 % (ref 0–2)
BILIRUB SERPL-MCNC: 1.2 MG/DL (ref 0.1–1)
BUN SERPL-MCNC: 66 MG/DL (ref 8–24)
CALCIUM SERPL-MCNC: 9.2 MG/DL (ref 8.5–10.1)
CHLORIDE SERPL-SCNC: 109 MMOL/L (ref 98–108)
CO2 SERPL-SCNC: 26 MMOL/L (ref 21–32)
CREAT SERPL-MCNC: 2.09 MG/DL (ref 0.6–1.2)
DEPRECATED RDW RBC AUTO: 50.5 FL (ref 35.1–46.3)
EOSINOPHIL # BLD AUTO: 0.07 K/MM3 (ref 0–0.68)
EOSINOPHIL NFR BLD AUTO: 1 % (ref 0–6)
ERYTHROCYTE [DISTWIDTH] IN BLOOD BY AUTOMATED COUNT: 18.4 % (ref 11.7–14.2)
FLUAV RNA SPEC QL NAA+PROBE: NEGATIVE
FLUBV RNA SPEC QL NAA+PROBE: NEGATIVE
GLOBULIN SER CALC-MCNC: 4 G/DL (ref 2.2–4)
GLUCOSE SERPL-MCNC: 107 MG/DL (ref 70–99)
HCT VFR BLD AUTO: 36.2 % (ref 37–53)
HGB BLD-MCNC: 11 G/DL (ref 13.5–17.5)
IMM GRANULOCYTES # BLD AUTO: 0.02 K/MM3 (ref 0–0.1)
IMM GRANULOCYTES NFR BLD AUTO: 0 % (ref 0–1)
LEUKOCYTE ESTERASE UR QL STRIP: (no result)
LYMPHOCYTES # BLD AUTO: 0.82 K/MM3 (ref 0.84–5.2)
LYMPHOCYTES NFR BLD AUTO: 10 % (ref 21–46)
MCHC RBC AUTO-ENTMCNC: 30.4 G/DL (ref 31.5–36.5)
MCV RBC AUTO: 77 FL (ref 80–100)
MONOCYTES # BLD AUTO: 0.85 K/MM3 (ref 0.16–1.47)
MONOCYTES NFR BLD AUTO: 11 % (ref 4–13)
NEUTROPHILS # BLD AUTO: 6.14 K/MM3 (ref 1.96–9.15)
NEUTROPHILS NFR BLD AUTO: 77 % (ref 41–73)
NRBC # BLD AUTO: 0.02 K/MM3 (ref 0–0.02)
NRBC BLD AUTO-RTO: 0.3 /100 WBC (ref 0–0.2)
PLATELET # BLD AUTO: 213 K/MM3 (ref 150–400)
POTASSIUM SERPL-SCNC: 4.5 MMOL/L (ref 3.5–5.5)
PROT SERPL-MCNC: 7.2 G/DL (ref 6.4–8.2)
PROT UR STRIP-MCNC: (no result) MG/DL
RBC #/AREA URNS HPF: (no result) /HPF (ref 0–2)
RSV RNA SPEC QL NAA+PROBE: NEGATIVE
SARS-COV-2 RNA RESP QL NAA+PROBE: NEGATIVE
SODIUM SERPL-SCNC: 140 MMOL/L (ref 136–145)
SP GR SPEC: 1.01 (ref 1–1.02)
TSH SERPL DL<=0.005 MIU/L-ACNC: 5.88 UIU/ML (ref 0.36–4.8)
UROBILINOGEN UR STRIP-MCNC: (no result) MG/DL

## 2022-10-11 PROCEDURE — A9270 NON-COVERED ITEM OR SERVICE: HCPCS

## 2022-10-11 PROCEDURE — G0378 HOSPITAL OBSERVATION PER HR: HCPCS

## 2022-10-11 NOTE — NUR
PT ARRIVED TO ROOM 305 VIA GURNEY AND TRANSFERRED TO BED.  INCONTINENT AND
ATTEMPTED TO HAVE A BM WITH NO RESULT.  EATING SUPPER.  DOES GET DROWSY AND
NEEDS A PROMPT TO ANSWER QUESTIONS.  REPORT GIVEN TO ONCOMING SHIFT.

## 2022-10-12 LAB
ANION GAP SERPL CALCULATED.4IONS-SCNC: 8 MMOL/L (ref 6–16)
BUN SERPL-MCNC: 61 MG/DL (ref 8–24)
CALCIUM SERPL-MCNC: 9.1 MG/DL (ref 8.5–10.1)
CHLORIDE SERPL-SCNC: 108 MMOL/L (ref 98–108)
CO2 SERPL-SCNC: 27 MMOL/L (ref 21–32)
CREAT SERPL-MCNC: 1.87 MG/DL (ref 0.6–1.2)
GLUCOSE SERPL-MCNC: 121 MG/DL (ref 70–99)
MAGNESIUM SERPL-MCNC: 2.5 MG/DL (ref 1.6–2.4)
POTASSIUM SERPL-SCNC: 4 MMOL/L (ref 3.5–5.5)
SODIUM SERPL-SCNC: 143 MMOL/L (ref 136–145)

## 2022-10-12 NOTE — NUR
SHIFT SUMMARY
PT SITTING UP ON SIDE OF BED MOST OF THE DAY.  HAS GOTTEN EXTREMELY ANXIOUS
THIS AFTERNOON AND SAYS HE NEEDS TO GO HOME RIGHT NOW DUE TO FEELING ANTSY.
OBTAINED NICOTINE AND VISTARIL ORDERS AND PT WILLING TO TRY THEM.  EXPLAINED
TO PT HE CAN GO ANYTIME BUT HE NEEDS TO FIND A RIDE AND SOMEONE WHO IS WILLING
TO TRY AND GET HIM IN A CAR DUE TO HIS EXTREME WEAKNESS.

## 2022-10-12 NOTE — NUR
PT CARE WAS TAKEN OVER BY THIS WRITER AT 3507. REPORT GIVEN BY OMAIRA REYNAGA. PT
HAS CALL LIGHT WITHIN REACH WILL. NO DISTRESS NOTED. WILL CONTINUE TO MONITOR.

## 2022-10-12 NOTE — NUR
NIGHT SHIFT SUMMARY:
A&Ox4. PLEASANT AND COOPERATIVE WITH CARE. VSS. 2-PERSON MAX ASSIST; UNABLE TO
STAND D/T WEAKNESS. LARGE BM LAST NIGHT. SEVERAL WOUNDS DOCUMENTED;  PICTURES
IN CHART. MEDICATION LIST NEEDS TO BE RECONCILIATED; LEFT VM WITH [facility]
PHARMACY REQUESTING MED LIST BE FAXED TO US. PT REPORTS TAKING PERCOCET 5/235
Q4h PRN. 2-3+ PITTING LLE & RUE. BILAT THIGHS, SCROTUM AND LOWER ABD
PARTICULARLY EDEMATOUS. WILL REPORT TO ONCOMING RN.

## 2022-10-13 LAB
ANION GAP SERPL CALCULATED.4IONS-SCNC: 5 MMOL/L (ref 6–16)
BASOPHILS # BLD AUTO: 0.05 K/MM3 (ref 0–0.23)
BASOPHILS NFR BLD AUTO: 1 % (ref 0–2)
BUN SERPL-MCNC: 57 MG/DL (ref 8–24)
CALCIUM SERPL-MCNC: 8.6 MG/DL (ref 8.5–10.1)
CHLORIDE SERPL-SCNC: 107 MMOL/L (ref 98–108)
CO2 SERPL-SCNC: 31 MMOL/L (ref 21–32)
CREAT SERPL-MCNC: 1.76 MG/DL (ref 0.6–1.2)
DEPRECATED RDW RBC AUTO: 50.7 FL (ref 35.1–46.3)
EOSINOPHIL # BLD AUTO: 0.25 K/MM3 (ref 0–0.68)
EOSINOPHIL NFR BLD AUTO: 4 % (ref 0–6)
ERYTHROCYTE [DISTWIDTH] IN BLOOD BY AUTOMATED COUNT: 18.5 % (ref 11.7–14.2)
FLUAV RNA SPEC QL NAA+PROBE: NEGATIVE
FLUBV RNA SPEC QL NAA+PROBE: NEGATIVE
GLUCOSE SERPL-MCNC: 99 MG/DL (ref 70–99)
HCT VFR BLD AUTO: 37 % (ref 37–53)
HGB BLD-MCNC: 11.2 G/DL (ref 13.5–17.5)
IMM GRANULOCYTES # BLD AUTO: 0.02 K/MM3 (ref 0–0.1)
IMM GRANULOCYTES NFR BLD AUTO: 0 % (ref 0–1)
LYMPHOCYTES # BLD AUTO: 1.12 K/MM3 (ref 0.84–5.2)
LYMPHOCYTES NFR BLD AUTO: 18 % (ref 21–46)
MAGNESIUM SERPL-MCNC: 2.5 MG/DL (ref 1.6–2.4)
MCHC RBC AUTO-ENTMCNC: 30.3 G/DL (ref 31.5–36.5)
MCV RBC AUTO: 77 FL (ref 80–100)
MONOCYTES # BLD AUTO: 0.7 K/MM3 (ref 0.16–1.47)
MONOCYTES NFR BLD AUTO: 11 % (ref 4–13)
NEUTROPHILS # BLD AUTO: 4.12 K/MM3 (ref 1.96–9.15)
NEUTROPHILS NFR BLD AUTO: 66 % (ref 41–73)
NRBC # BLD AUTO: 0.02 K/MM3 (ref 0–0.02)
NRBC BLD AUTO-RTO: 0.3 /100 WBC (ref 0–0.2)
PHOSPHATE SERPL-MCNC: 3.2 MG/DL (ref 2.5–4.9)
PLATELET # BLD AUTO: 192 K/MM3 (ref 150–400)
POTASSIUM SERPL-SCNC: 3.7 MMOL/L (ref 3.5–5.5)
RSV RNA SPEC QL NAA+PROBE: NEGATIVE
SARS-COV-2 RNA RESP QL NAA+PROBE: NEGATIVE
SODIUM SERPL-SCNC: 143 MMOL/L (ref 136–145)

## 2022-10-13 NOTE — NUR
REPORT CALLED TO HERNAN AT [facility].  PICKED UP BY W/C AND TAKEN TO
[facility] AT 1700.  HAD A LARGE BM JUST PRIOR TO LEAVING.  2 PERSON MAX ASSIST
WITH GAIT BELT AND PT ASSIST WITH TRANSFERS.  UNABLE TO STAND UP FULLY BUT
ABLE TO MOVE WITH HIS ARMS.

## 2022-10-13 NOTE — NUR
NIGHT SHIFT SUMMARY:
A&Ox4. PLEASANT AND COOPERATIVE WITH CARE. VSS. CALLS APPROPRIATELY. USING
BEDSIDE URINAL. MEDICATED x2 CHRONIC BACK PAIN. TELE A-FIB @83bpm. ECHO DONE
YESTERDAY SHOWS EF 20-25%. NO C/O SOB OR CP. WILL REPORT TO ONCOMING RN.

## 2022-10-24 ENCOUNTER — HOSPITAL ENCOUNTER (OUTPATIENT)
Dept: HOSPITAL 95 - WOUND | Age: 82
Discharge: HOME | End: 2022-10-24
Attending: SURGERY
Payer: COMMERCIAL

## 2022-10-24 DIAGNOSIS — Z99.3: ICD-10-CM

## 2022-10-24 DIAGNOSIS — L89.323: Primary | ICD-10-CM

## 2022-10-24 PROCEDURE — A9270 NON-COVERED ITEM OR SERVICE: HCPCS

## 2022-10-24 PROCEDURE — G0463 HOSPITAL OUTPT CLINIC VISIT: HCPCS

## 2022-10-31 ENCOUNTER — HOSPITAL ENCOUNTER (OUTPATIENT)
Dept: HOSPITAL 95 - WOUND | Age: 82
Discharge: HOME | End: 2022-10-31
Attending: SURGERY
Payer: COMMERCIAL

## 2022-10-31 DIAGNOSIS — L89.152: Primary | ICD-10-CM

## 2022-10-31 DIAGNOSIS — L89.323: ICD-10-CM

## 2022-10-31 DIAGNOSIS — Z99.3: ICD-10-CM

## 2022-10-31 PROCEDURE — G0463 HOSPITAL OUTPT CLINIC VISIT: HCPCS

## 2022-10-31 PROCEDURE — A9270 NON-COVERED ITEM OR SERVICE: HCPCS

## 2022-11-07 ENCOUNTER — HOSPITAL ENCOUNTER (OUTPATIENT)
Dept: HOSPITAL 95 - WOUND | Age: 82
Discharge: HOME | End: 2022-11-07
Attending: SURGERY
Payer: COMMERCIAL

## 2022-11-07 DIAGNOSIS — S91.309D: ICD-10-CM

## 2022-11-07 DIAGNOSIS — Z99.3: ICD-10-CM

## 2022-11-07 DIAGNOSIS — L89.152: ICD-10-CM

## 2022-11-07 DIAGNOSIS — L89.323: Primary | ICD-10-CM

## 2022-11-07 DIAGNOSIS — S81.809D: ICD-10-CM

## 2022-11-07 PROCEDURE — A9270 NON-COVERED ITEM OR SERVICE: HCPCS

## 2022-11-07 PROCEDURE — G0463 HOSPITAL OUTPT CLINIC VISIT: HCPCS

## 2022-11-28 ENCOUNTER — HOSPITAL ENCOUNTER (OUTPATIENT)
Age: 82
Discharge: HOME | End: 2022-11-28
Payer: COMMERCIAL

## 2022-11-28 DIAGNOSIS — L97.822: ICD-10-CM

## 2022-11-28 DIAGNOSIS — L89.324: Primary | ICD-10-CM

## 2022-11-28 DIAGNOSIS — L97.522: ICD-10-CM

## 2022-11-28 DIAGNOSIS — I87.2: ICD-10-CM

## 2022-11-30 ENCOUNTER — HOSPITAL ENCOUNTER (OUTPATIENT)
Dept: HOSPITAL 95 - LAB SHORT | Age: 82
Discharge: HOME | End: 2022-11-30
Attending: STUDENT IN AN ORGANIZED HEALTH CARE EDUCATION/TRAINING PROGRAM
Payer: COMMERCIAL

## 2022-11-30 DIAGNOSIS — L97.909: ICD-10-CM

## 2022-11-30 DIAGNOSIS — I50.9: Primary | ICD-10-CM

## 2022-11-30 LAB
ALBUMIN SERPL BCP-MCNC: 3.3 G/DL (ref 3.4–5)
ALBUMIN/GLOB SERPL: 0.8 {RATIO} (ref 0.8–1.8)
ALT SERPL W P-5'-P-CCNC: 18 U/L (ref 12–78)
ANION GAP SERPL CALCULATED.4IONS-SCNC: 5 MMOL/L (ref 6–16)
AST SERPL W P-5'-P-CCNC: 20 U/L (ref 12–37)
BASOPHILS # BLD AUTO: 0.09 K/MM3 (ref 0–0.23)
BASOPHILS NFR BLD AUTO: 1 % (ref 0–2)
BILIRUB SERPL-MCNC: 0.6 MG/DL (ref 0.1–1)
BUN SERPL-MCNC: 67 MG/DL (ref 8–24)
CALCIUM SERPL-MCNC: 9.2 MG/DL (ref 8.5–10.1)
CHLORIDE SERPL-SCNC: 102 MMOL/L (ref 98–108)
CO2 SERPL-SCNC: 31 MMOL/L (ref 21–32)
CREAT SERPL-MCNC: 1.38 MG/DL (ref 0.6–1.2)
DEPRECATED RDW RBC AUTO: 56.8 FL (ref 35.1–46.3)
EOSINOPHIL # BLD AUTO: 0.16 K/MM3 (ref 0–0.68)
EOSINOPHIL NFR BLD AUTO: 2 % (ref 0–6)
ERYTHROCYTE [DISTWIDTH] IN BLOOD BY AUTOMATED COUNT: 20.1 % (ref 11.7–14.2)
GLOBULIN SER CALC-MCNC: 4.4 G/DL (ref 2.2–4)
GLUCOSE SERPL-MCNC: 138 MG/DL (ref 70–99)
HCT VFR BLD AUTO: 34.7 % (ref 37–53)
HGB BLD-MCNC: 10.5 G/DL (ref 13.5–17.5)
IMM GRANULOCYTES # BLD AUTO: 0.03 K/MM3 (ref 0–0.1)
IMM GRANULOCYTES NFR BLD AUTO: 0 % (ref 0–1)
LYMPHOCYTES # BLD AUTO: 1.38 K/MM3 (ref 0.84–5.2)
LYMPHOCYTES NFR BLD AUTO: 17 % (ref 21–46)
MCHC RBC AUTO-ENTMCNC: 30.3 G/DL (ref 31.5–36.5)
MCV RBC AUTO: 79 FL (ref 80–100)
MONOCYTES # BLD AUTO: 0.9 K/MM3 (ref 0.16–1.47)
MONOCYTES NFR BLD AUTO: 11 % (ref 4–13)
NEUTROPHILS # BLD AUTO: 5.54 K/MM3 (ref 1.96–9.15)
NEUTROPHILS NFR BLD AUTO: 68 % (ref 41–73)
NRBC # BLD AUTO: 0 K/MM3 (ref 0–0.02)
NRBC BLD AUTO-RTO: 0 /100 WBC (ref 0–0.2)
PLATELET # BLD AUTO: 251 K/MM3 (ref 150–400)
POTASSIUM SERPL-SCNC: 4.1 MMOL/L (ref 3.5–5.5)
PROT SERPL-MCNC: 7.7 G/DL (ref 6.4–8.2)
SODIUM SERPL-SCNC: 138 MMOL/L (ref 136–145)

## 2022-12-16 ENCOUNTER — HOSPITAL ENCOUNTER (OUTPATIENT)
Dept: HOSPITAL 95 - WOUND | Age: 82
Discharge: HOME | End: 2022-12-16
Attending: SURGERY
Payer: COMMERCIAL

## 2022-12-16 DIAGNOSIS — E11.51: ICD-10-CM

## 2022-12-16 DIAGNOSIS — L97.522: ICD-10-CM

## 2022-12-16 DIAGNOSIS — L89.324: ICD-10-CM

## 2022-12-16 DIAGNOSIS — L97.525: ICD-10-CM

## 2022-12-16 DIAGNOSIS — E11.621: Primary | ICD-10-CM

## 2022-12-16 PROCEDURE — G0463 HOSPITAL OUTPT CLINIC VISIT: HCPCS

## 2022-12-16 PROCEDURE — A9270 NON-COVERED ITEM OR SERVICE: HCPCS

## 2022-12-23 ENCOUNTER — HOSPITAL ENCOUNTER (OUTPATIENT)
Dept: HOSPITAL 95 - WOUND | Age: 82
LOS: 1 days | Discharge: HOME | End: 2022-12-24
Attending: SURGERY
Payer: COMMERCIAL

## 2022-12-23 DIAGNOSIS — L97.822: ICD-10-CM

## 2022-12-23 DIAGNOSIS — E11.51: ICD-10-CM

## 2022-12-23 DIAGNOSIS — E11.621: ICD-10-CM

## 2022-12-23 DIAGNOSIS — L97.512: ICD-10-CM

## 2022-12-23 DIAGNOSIS — L97.522: ICD-10-CM

## 2022-12-23 DIAGNOSIS — L89.324: ICD-10-CM

## 2022-12-23 DIAGNOSIS — E11.622: Primary | ICD-10-CM

## 2022-12-23 PROCEDURE — A9270 NON-COVERED ITEM OR SERVICE: HCPCS

## 2022-12-30 ENCOUNTER — HOSPITAL ENCOUNTER (EMERGENCY)
Dept: HOSPITAL 95 - ER | Age: 82
Discharge: HOME | End: 2022-12-30
Payer: COMMERCIAL

## 2022-12-30 VITALS — WEIGHT: 150 LBS | HEIGHT: 72 IN | BODY MASS INDEX: 20.32 KG/M2

## 2022-12-30 DIAGNOSIS — J44.9: ICD-10-CM

## 2022-12-30 DIAGNOSIS — N39.0: Primary | ICD-10-CM

## 2022-12-30 DIAGNOSIS — E11.9: ICD-10-CM

## 2022-12-30 DIAGNOSIS — E86.0: ICD-10-CM

## 2022-12-30 DIAGNOSIS — I50.9: ICD-10-CM

## 2022-12-30 DIAGNOSIS — Z79.899: ICD-10-CM

## 2022-12-30 LAB
ALBUMIN SERPL BCP-MCNC: 2.8 G/DL (ref 3.4–5)
ALBUMIN/GLOB SERPL: 0.7 {RATIO} (ref 0.8–1.8)
ALT SERPL W P-5'-P-CCNC: 17 U/L (ref 12–78)
ANION GAP SERPL CALCULATED.4IONS-SCNC: 7 MMOL/L (ref 6–16)
AST SERPL W P-5'-P-CCNC: 16 U/L (ref 12–37)
BASOPHILS # BLD AUTO: 0.04 K/MM3 (ref 0–0.23)
BASOPHILS NFR BLD AUTO: 0 % (ref 0–2)
BILIRUB SERPL-MCNC: 0.9 MG/DL (ref 0.1–1)
BUN SERPL-MCNC: 47 MG/DL (ref 8–24)
CALCIUM SERPL-MCNC: 8.7 MG/DL (ref 8.5–10.1)
CHLORIDE SERPL-SCNC: 104 MMOL/L (ref 98–108)
CO2 SERPL-SCNC: 27 MMOL/L (ref 21–32)
CREAT SERPL-MCNC: 1.36 MG/DL (ref 0.6–1.2)
DEPRECATED RDW RBC AUTO: 54.4 FL (ref 35.1–46.3)
EOSINOPHIL # BLD AUTO: 0.04 K/MM3 (ref 0–0.68)
EOSINOPHIL NFR BLD AUTO: 0 % (ref 0–6)
ERYTHROCYTE [DISTWIDTH] IN BLOOD BY AUTOMATED COUNT: 19.2 % (ref 11.7–14.2)
GLOBULIN SER CALC-MCNC: 4.2 G/DL (ref 2.2–4)
GLUCOSE SERPL-MCNC: 90 MG/DL (ref 70–99)
HCT VFR BLD AUTO: 32 % (ref 37–53)
HGB BLD-MCNC: 10.1 G/DL (ref 13.5–17.5)
IMM GRANULOCYTES # BLD AUTO: 0.07 K/MM3 (ref 0–0.1)
IMM GRANULOCYTES NFR BLD AUTO: 1 % (ref 0–1)
LEUKOCYTE ESTERASE UR QL STRIP: (no result)
LYMPHOCYTES # BLD AUTO: 1.01 K/MM3 (ref 0.84–5.2)
LYMPHOCYTES NFR BLD AUTO: 7 % (ref 21–46)
MCHC RBC AUTO-ENTMCNC: 31.6 G/DL (ref 31.5–36.5)
MCV RBC AUTO: 78 FL (ref 80–100)
MONOCYTES # BLD AUTO: 1.1 K/MM3 (ref 0.16–1.47)
MONOCYTES NFR BLD AUTO: 8 % (ref 4–13)
NEUTROPHILS # BLD AUTO: 11.59 K/MM3 (ref 1.96–9.15)
NEUTROPHILS NFR BLD AUTO: 84 % (ref 41–73)
NRBC # BLD AUTO: 0 K/MM3 (ref 0–0.02)
NRBC BLD AUTO-RTO: 0 /100 WBC (ref 0–0.2)
PLATELET # BLD AUTO: 288 K/MM3 (ref 150–400)
POTASSIUM SERPL-SCNC: 4.3 MMOL/L (ref 3.5–5.5)
PROT SERPL-MCNC: 7 G/DL (ref 6.4–8.2)
SODIUM SERPL-SCNC: 138 MMOL/L (ref 136–145)
SP GR SPEC: 1.01 (ref 1–1.02)
UROBILINOGEN UR STRIP-MCNC: (no result) MG/DL
WBC #/AREA URNS HPF: (no result) /HPF (ref 0–5)

## 2023-01-05 ENCOUNTER — HOSPITAL ENCOUNTER (INPATIENT)
Dept: HOSPITAL 95 - ER | Age: 83
LOS: 6 days | Discharge: NURSING, REHAB OR LONG-TERM CARE | DRG: 853 | End: 2023-01-11
Attending: INTERNAL MEDICINE | Admitting: INTERNAL MEDICINE
Payer: COMMERCIAL

## 2023-01-05 VITALS — BODY MASS INDEX: 18.79 KG/M2 | HEIGHT: 76 IN | WEIGHT: 154.32 LBS

## 2023-01-05 DIAGNOSIS — Z51.5: ICD-10-CM

## 2023-01-05 DIAGNOSIS — L03.116: ICD-10-CM

## 2023-01-05 DIAGNOSIS — Z79.84: ICD-10-CM

## 2023-01-05 DIAGNOSIS — I95.9: ICD-10-CM

## 2023-01-05 DIAGNOSIS — J18.9: ICD-10-CM

## 2023-01-05 DIAGNOSIS — Z79.899: ICD-10-CM

## 2023-01-05 DIAGNOSIS — D63.1: ICD-10-CM

## 2023-01-05 DIAGNOSIS — Z66: ICD-10-CM

## 2023-01-05 DIAGNOSIS — B96.5: ICD-10-CM

## 2023-01-05 DIAGNOSIS — A41.51: ICD-10-CM

## 2023-01-05 DIAGNOSIS — Z87.440: ICD-10-CM

## 2023-01-05 DIAGNOSIS — N18.31: ICD-10-CM

## 2023-01-05 DIAGNOSIS — I50.22: ICD-10-CM

## 2023-01-05 DIAGNOSIS — I48.91: ICD-10-CM

## 2023-01-05 DIAGNOSIS — J44.0: ICD-10-CM

## 2023-01-05 DIAGNOSIS — E44.0: ICD-10-CM

## 2023-01-05 DIAGNOSIS — Z20.822: ICD-10-CM

## 2023-01-05 DIAGNOSIS — K21.9: ICD-10-CM

## 2023-01-05 DIAGNOSIS — N40.0: ICD-10-CM

## 2023-01-05 DIAGNOSIS — E11.40: ICD-10-CM

## 2023-01-05 DIAGNOSIS — R82.71: ICD-10-CM

## 2023-01-05 DIAGNOSIS — L89.44: ICD-10-CM

## 2023-01-05 DIAGNOSIS — Z89.511: ICD-10-CM

## 2023-01-05 DIAGNOSIS — F17.210: ICD-10-CM

## 2023-01-05 DIAGNOSIS — A41.02: Primary | ICD-10-CM

## 2023-01-05 DIAGNOSIS — R62.7: ICD-10-CM

## 2023-01-05 DIAGNOSIS — E11.22: ICD-10-CM

## 2023-01-05 LAB
ALBUMIN SERPL BCP-MCNC: 2.6 G/DL (ref 3.4–5)
ALBUMIN/GLOB SERPL: 0.6 {RATIO} (ref 0.8–1.8)
ALT SERPL W P-5'-P-CCNC: 12 U/L (ref 12–78)
ANION GAP SERPL CALCULATED.4IONS-SCNC: 5 MMOL/L (ref 6–16)
AST SERPL W P-5'-P-CCNC: 13 U/L (ref 12–37)
BASOPHILS # BLD AUTO: 0.07 K/MM3 (ref 0–0.23)
BASOPHILS NFR BLD AUTO: 0 % (ref 0–2)
BILIRUB SERPL-MCNC: 0.7 MG/DL (ref 0.1–1)
BUN SERPL-MCNC: 42 MG/DL (ref 8–24)
CALCIUM SERPL-MCNC: 8.6 MG/DL (ref 8.5–10.1)
CHLORIDE SERPL-SCNC: 105 MMOL/L (ref 98–108)
CO2 SERPL-SCNC: 27 MMOL/L (ref 21–32)
CREAT SERPL-MCNC: 1.35 MG/DL (ref 0.6–1.2)
DEPRECATED RDW RBC AUTO: 55.9 FL (ref 35.1–46.3)
EOSINOPHIL # BLD AUTO: 0.08 K/MM3 (ref 0–0.68)
EOSINOPHIL NFR BLD AUTO: 0 % (ref 0–6)
ERYTHROCYTE [DISTWIDTH] IN BLOOD BY AUTOMATED COUNT: 19.6 % (ref 11.7–14.2)
GLOBULIN SER CALC-MCNC: 4.3 G/DL (ref 2.2–4)
GLUCOSE SERPL-MCNC: 83 MG/DL (ref 70–99)
HCT VFR BLD AUTO: 31.4 % (ref 37–53)
HGB BLD-MCNC: 9.7 G/DL (ref 13.5–17.5)
IMM GRANULOCYTES # BLD AUTO: 0.13 K/MM3 (ref 0–0.1)
IMM GRANULOCYTES NFR BLD AUTO: 1 % (ref 0–1)
LEUKOCYTE ESTERASE UR QL STRIP: (no result)
LYMPHOCYTES # BLD AUTO: 0.9 K/MM3 (ref 0.84–5.2)
LYMPHOCYTES NFR BLD AUTO: 4 % (ref 21–46)
MCHC RBC AUTO-ENTMCNC: 30.9 G/DL (ref 31.5–36.5)
MCV RBC AUTO: 79 FL (ref 80–100)
MONOCYTES # BLD AUTO: 1.31 K/MM3 (ref 0.16–1.47)
MONOCYTES NFR BLD AUTO: 6 % (ref 4–13)
NEUTROPHILS # BLD AUTO: 18.84 K/MM3 (ref 1.96–9.15)
NEUTROPHILS NFR BLD AUTO: 88 % (ref 41–73)
NRBC # BLD AUTO: 0 K/MM3 (ref 0–0.02)
NRBC BLD AUTO-RTO: 0 /100 WBC (ref 0–0.2)
PLATELET # BLD AUTO: 307 K/MM3 (ref 150–400)
POTASSIUM SERPL-SCNC: 4.4 MMOL/L (ref 3.5–5.5)
PROT SERPL-MCNC: 6.9 G/DL (ref 6.4–8.2)
RBC #/AREA URNS HPF: (no result) /HPF (ref 0–2)
SODIUM SERPL-SCNC: 137 MMOL/L (ref 136–145)
SP GR SPEC: 1.02 (ref 1–1.02)
UROBILINOGEN UR STRIP-MCNC: (no result) MG/DL

## 2023-01-05 PROCEDURE — A9270 NON-COVERED ITEM OR SERVICE: HCPCS

## 2023-01-05 PROCEDURE — 3E03329 INTRODUCTION OF OTHER ANTI-INFECTIVE INTO PERIPHERAL VEIN, PERCUTANEOUS APPROACH: ICD-10-PCS | Performed by: INTERNAL MEDICINE

## 2023-01-05 NOTE — NUR
Patient admitted from ED for sepsis, patients voice is mumbled, slept through
admit intake. 2nd RN for skin check. Patient had scabs/skin tears t/o body.
Large deep wound noted on left ischial site. Wound photos documented in hard
chart. Small amount of yellow exudate, strong foul odor noted. Cleaned &
packed and placed new dressing. IV vancomycin infusing. Patient has right BKA,
repositioned with pillows to float buttocks. Patient resting in bed, denies
pain. Tele in place.
Will continue plan of care, awiating discharge planning.

## 2023-01-06 LAB
ANION GAP SERPL CALCULATED.4IONS-SCNC: 6 MMOL/L (ref 6–16)
BASOPHILS # BLD AUTO: 0.05 K/MM3 (ref 0–0.23)
BASOPHILS NFR BLD AUTO: 0 % (ref 0–2)
BUN SERPL-MCNC: 37 MG/DL (ref 8–24)
CALCIUM SERPL-MCNC: 8.4 MG/DL (ref 8.5–10.1)
CHLORIDE SERPL-SCNC: 107 MMOL/L (ref 98–108)
CO2 SERPL-SCNC: 27 MMOL/L (ref 21–32)
CREAT SERPL-MCNC: 1.25 MG/DL (ref 0.6–1.2)
DEPRECATED RDW RBC AUTO: 54.7 FL (ref 35.1–46.3)
EOSINOPHIL # BLD AUTO: 0.04 K/MM3 (ref 0–0.68)
EOSINOPHIL NFR BLD AUTO: 0 % (ref 0–6)
ERYTHROCYTE [DISTWIDTH] IN BLOOD BY AUTOMATED COUNT: 19.4 % (ref 11.7–14.2)
FERRITIN SERPL-MCNC: 192 NG/ML (ref 26–388)
GLUCOSE SERPL-MCNC: 77 MG/DL (ref 70–99)
HCT VFR BLD AUTO: 29.3 % (ref 37–53)
HGB BLD-MCNC: 9.2 G/DL (ref 13.5–17.5)
IMM GRANULOCYTES # BLD AUTO: 0.1 K/MM3 (ref 0–0.1)
IMM GRANULOCYTES NFR BLD AUTO: 1 % (ref 0–1)
LYMPHOCYTES # BLD AUTO: 0.95 K/MM3 (ref 0.84–5.2)
LYMPHOCYTES NFR BLD AUTO: 6 % (ref 21–46)
MAGNESIUM SERPL-MCNC: 2.4 MG/DL (ref 1.6–2.4)
MCHC RBC AUTO-ENTMCNC: 31.4 G/DL (ref 31.5–36.5)
MCV RBC AUTO: 78 FL (ref 80–100)
MONOCYTES # BLD AUTO: 0.86 K/MM3 (ref 0.16–1.47)
MONOCYTES NFR BLD AUTO: 6 % (ref 4–13)
NEUTROPHILS # BLD AUTO: 13.04 K/MM3 (ref 1.96–9.15)
NEUTROPHILS NFR BLD AUTO: 87 % (ref 41–73)
NRBC # BLD AUTO: 0 K/MM3 (ref 0–0.02)
NRBC BLD AUTO-RTO: 0 /100 WBC (ref 0–0.2)
PHOSPHATE SERPL-MCNC: 3.2 MG/DL (ref 2.5–4.9)
PLATELET # BLD AUTO: 274 K/MM3 (ref 150–400)
POTASSIUM SERPL-SCNC: 3.8 MMOL/L (ref 3.5–5.5)
SODIUM SERPL-SCNC: 140 MMOL/L (ref 136–145)
TIBC SERPL-MCNC: 187 UG/DL (ref 250–450)

## 2023-01-06 PROCEDURE — 0QB30ZZ EXCISION OF LEFT PELVIC BONE, OPEN APPROACH: ICD-10-PCS | Performed by: SURGERY

## 2023-01-06 NOTE — NUR
DAYSHIFT SUMMARY
Surgical consult this morning, Dr. Cabral assessed pressure ulcer, plan is to
do surgical debridement. Patient went to surgery at 1200 & returned to medical
floor at 1530. Patient doing well sleeping comfortably in bed. Vitals stable,
patient denies pain at this time. Tele reports several runs of PVCs today.
Orders to change dressing daily, apply calcium alginate rope to left ishium
wound. Will continue plan of care, awaiting discharge planning.

## 2023-01-06 NOTE — NUR
NIGHT SHIFT SUMMARY
 
PT A/OX4. PT REPORTING 9/10 PAIN IN BACK/COCCYX. PT STATES HE TAKES PERCOSET
10/325
QID AT HOME; CALL TO ON CALL/DR LANDA--NEW ORDER F/PERCOSET 10/325 Q4HRS
PRN AND
DC 0900 SCHEDULED DOSE. PT ON BEDREST T/O THE NIGHT; USING THE URINAL IN BED;
HAVING
SOME DIFFICULTY SPILLING. DID COMPLETE BED CHANGE AND CHANGED DRESSING ON
COCCYX WOUND;
TOOK DEEP TISSUE CULTURE AND SENT TO LAB. WOUND IS DRAINING AND
PURULENT/ODOROUS.
NOTED PATIENT PENIS ENLARGED AND FORMATIONS UNDER
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 FORESKIN DIFFICULT TO RETRACT AND
RED. PT SAYS
ENLARGED MORE THAN NORMAL AND HAS BEEN UNABLE TO RETRACT SKIN--PT STATES DR IS
AWARE WHEN
QUESTIONED; COLORATION/CIRCULATION APPEARS NORMAL.
 
CALLED SURGICAL CONSULT TO DR VILLEDA ANSWERING SERVICE F/LEFT ISCHIAL DECUB;
NO ORDER FOR
NPO; PLACED PT ON NPO AFTER MIDNIGHT AS PRECAUTION. WILL ADVISE DAY SHIFT
NURSE. PT ABLE TO
MAKE NEEDS KNOWN; CALL LIGHT ACCESSIBLE. BED LOCKED/LOW.

## 2023-01-07 LAB
ANION GAP SERPL CALCULATED.4IONS-SCNC: 0 MMOL/L (ref 6–16)
BASOPHILS # BLD AUTO: 0.05 K/MM3 (ref 0–0.23)
BASOPHILS NFR BLD AUTO: 0 % (ref 0–2)
BUN SERPL-MCNC: 34 MG/DL (ref 8–24)
CALCIUM SERPL-MCNC: 8.3 MG/DL (ref 8.5–10.1)
CHLORIDE SERPL-SCNC: 111 MMOL/L (ref 98–108)
CO2 SERPL-SCNC: 32 MMOL/L (ref 21–32)
CREAT SERPL-MCNC: 1.15 MG/DL (ref 0.6–1.2)
DEPRECATED RDW RBC AUTO: 55.5 FL (ref 35.1–46.3)
EOSINOPHIL # BLD AUTO: 0.07 K/MM3 (ref 0–0.68)
EOSINOPHIL NFR BLD AUTO: 1 % (ref 0–6)
ERYTHROCYTE [DISTWIDTH] IN BLOOD BY AUTOMATED COUNT: 19.5 % (ref 11.7–14.2)
GLUCOSE SERPL-MCNC: 103 MG/DL (ref 70–99)
HCT VFR BLD AUTO: 29 % (ref 37–53)
HGB BLD-MCNC: 9.1 G/DL (ref 13.5–17.5)
IMM GRANULOCYTES # BLD AUTO: 0.08 K/MM3 (ref 0–0.1)
IMM GRANULOCYTES NFR BLD AUTO: 1 % (ref 0–1)
LYMPHOCYTES # BLD AUTO: 1.15 K/MM3 (ref 0.84–5.2)
LYMPHOCYTES NFR BLD AUTO: 10 % (ref 21–46)
MCHC RBC AUTO-ENTMCNC: 31.4 G/DL (ref 31.5–36.5)
MCV RBC AUTO: 78 FL (ref 80–100)
MONOCYTES # BLD AUTO: 0.72 K/MM3 (ref 0.16–1.47)
MONOCYTES NFR BLD AUTO: 6 % (ref 4–13)
NEUTROPHILS # BLD AUTO: 9.85 K/MM3 (ref 1.96–9.15)
NEUTROPHILS NFR BLD AUTO: 83 % (ref 41–73)
NRBC # BLD AUTO: 0 K/MM3 (ref 0–0.02)
NRBC BLD AUTO-RTO: 0 /100 WBC (ref 0–0.2)
PLATELET # BLD AUTO: 292 K/MM3 (ref 150–400)
POTASSIUM SERPL-SCNC: 3.8 MMOL/L (ref 3.5–5.5)
SODIUM SERPL-SCNC: 143 MMOL/L (ref 136–145)

## 2023-01-07 NOTE — NUR
pt laying in bed awake a/ox3, occ forgetful pleasant and cooperative with
care, follows commands well, a bit sleepy this am, reports back pain that is
his chronic low back pain, does not complain of wound pain, medicated with
tylenol until oxy is due, lungs are clear a bit dim in bases laney right base,
on r/a, resp even and unlabored, no cough noted, hrirr, tele in place running
afib per monitor, see strip, pp not palpated, on left, has a rbka, piv to lfa
site is clear and patent, infusing ns at 75mls/hr, btx4, abd flat soft
nontender, voids via urinal and is incont at times, briefs in place, skin has
wound to buttocks that had an I&D yesterday, mepilex in place, moves upper ext
well, feeds himself, takes po meds without diff, alejandro, call light in reach.

## 2023-01-07 NOTE — NUR
pt resting most of the day, iv began leaking, placed new 20 gauge to rfa, lfa
removed intact, medicated for pain, turned, dressing changed on bottom, call
light in reach.

## 2023-01-07 NOTE — NUR
SHIFT SUMMARY;
 
NO ACUTE CHANGES OVERNIGHT. THE PT HAD 6 BEATS OF V-TACH LAST NIGHT BUT WAS
ASYMPTOMATIC. NOTIFIED THE HOSPITALIST, NO NEW ORDERS AT THIS TIME. THE PT
HAD A BM LAST NIGHT THAT ENDED UP SOILING HIS DRESSING OVER THE L BUTTOCK
ULCER. CHARGE RN KJ AND I DID A DRESSING CHANGE ON THE PTS WOUND PER THE
ORDER. THE PT TOLERATED THE DRESSING CHANGE WELL. TELE IN PLACE, A-FIB IN THE
100'S. THE PT HAS BEEN RESTING IN BED T/O THE NIGHT. CURRENTLY THE PT IS
RESTING IN BED WITH THE BED IN THE LOWEST POSITION AND THE CALL LIGHT AT
BEDSIDE.

## 2023-01-08 LAB
ANION GAP SERPL CALCULATED.4IONS-SCNC: 3 MMOL/L (ref 6–16)
BASOPHILS # BLD AUTO: 0.05 K/MM3 (ref 0–0.23)
BASOPHILS NFR BLD AUTO: 1 % (ref 0–2)
BUN SERPL-MCNC: 35 MG/DL (ref 8–24)
CALCIUM SERPL-MCNC: 8.3 MG/DL (ref 8.5–10.1)
CHLORIDE SERPL-SCNC: 109 MMOL/L (ref 98–108)
CO2 SERPL-SCNC: 28 MMOL/L (ref 21–32)
CREAT SERPL-MCNC: 0.96 MG/DL (ref 0.6–1.2)
DEPRECATED RDW RBC AUTO: 55.8 FL (ref 35.1–46.3)
EOSINOPHIL # BLD AUTO: 0.33 K/MM3 (ref 0–0.68)
EOSINOPHIL NFR BLD AUTO: 3 % (ref 0–6)
ERYTHROCYTE [DISTWIDTH] IN BLOOD BY AUTOMATED COUNT: 19.6 % (ref 11.7–14.2)
GLUCOSE SERPL-MCNC: 128 MG/DL (ref 70–99)
HCT VFR BLD AUTO: 31.5 % (ref 37–53)
HGB BLD-MCNC: 9.7 G/DL (ref 13.5–17.5)
IMM GRANULOCYTES # BLD AUTO: 0.05 K/MM3 (ref 0–0.1)
IMM GRANULOCYTES NFR BLD AUTO: 1 % (ref 0–1)
LYMPHOCYTES # BLD AUTO: 1.22 K/MM3 (ref 0.84–5.2)
LYMPHOCYTES NFR BLD AUTO: 12 % (ref 21–46)
MCHC RBC AUTO-ENTMCNC: 30.8 G/DL (ref 31.5–36.5)
MCV RBC AUTO: 79 FL (ref 80–100)
MONOCYTES # BLD AUTO: 0.81 K/MM3 (ref 0.16–1.47)
MONOCYTES NFR BLD AUTO: 8 % (ref 4–13)
NEUTROPHILS # BLD AUTO: 7.56 K/MM3 (ref 1.96–9.15)
NEUTROPHILS NFR BLD AUTO: 75 % (ref 41–73)
NRBC # BLD AUTO: 0 K/MM3 (ref 0–0.02)
NRBC BLD AUTO-RTO: 0 /100 WBC (ref 0–0.2)
PLATELET # BLD AUTO: 279 K/MM3 (ref 150–400)
POTASSIUM SERPL-SCNC: 4 MMOL/L (ref 3.5–5.5)
SODIUM SERPL-SCNC: 140 MMOL/L (ref 136–145)
VANCOMYCIN TROUGH SERPL-MCNC: 12.6 UG/ML (ref 5–10)

## 2023-01-08 NOTE — NUR
pt laying in bed watching tv, a/ox3, forgetful at times, states he didn't
sleep well, lungs are clear in upper fields, dim in bases, resp even and
unlabored, no cough noted, on r/a, hrirr, tele in place running afib per
monitor, see strip, no edeam noted, ppp unable to palpate on left foot, right
bka, uses urinal to void, incont of stool, briefs in place, skin has large
open wound to coccyx, and scabs scattered on low ext, able to help turn,
otherwise total care, pain med given this am, and turned on side, call light
in reach.

## 2023-01-08 NOTE — NUR
Pt had an uneventful day, has been turned, had a bm this am, sleeps at times,
no acute changes this shift, call light in reach.

## 2023-01-08 NOTE — NUR
SHIFT SUMMARY;
 
NO ACUTE CHANGES OVERNIGHT. THE PT RESTED IN BED T/O THE NIGHT. THE PT
REQUESTED PRN OXYCODONE LAST NIGHT. PT DENIES ANY CHEST PAIN OR SOB THIS
SHIFT. TELE IS IN PLACE, THE PT HAS BEEN RUNNING A-FIB IN THE 80/90'S.
CURRENTLY THE PT IS SLEEPING IN BED WITH THE BED IN THE LOWEST POSITION AND
THE CALL LIGHT IN THE BED WITH HIM.

## 2023-01-09 LAB
ANION GAP SERPL CALCULATED.4IONS-SCNC: 0 MMOL/L (ref 6–16)
BASOPHILS # BLD AUTO: 0.12 K/MM3 (ref 0–0.23)
BASOPHILS NFR BLD AUTO: 1 % (ref 0–2)
BUN SERPL-MCNC: 27 MG/DL (ref 8–24)
CALCIUM SERPL-MCNC: 8.2 MG/DL (ref 8.5–10.1)
CHLORIDE SERPL-SCNC: 110 MMOL/L (ref 98–108)
CO2 SERPL-SCNC: 28 MMOL/L (ref 21–32)
CREAT SERPL-MCNC: 0.85 MG/DL (ref 0.6–1.2)
DEPRECATED RDW RBC AUTO: 56.9 FL (ref 35.1–46.3)
EOSINOPHIL # BLD AUTO: 0.28 K/MM3 (ref 0–0.68)
EOSINOPHIL NFR BLD AUTO: 3 % (ref 0–6)
ERYTHROCYTE [DISTWIDTH] IN BLOOD BY AUTOMATED COUNT: 19.8 % (ref 11.7–14.2)
GLUCOSE SERPL-MCNC: 115 MG/DL (ref 70–99)
HCT VFR BLD AUTO: 34.8 % (ref 37–53)
HGB BLD-MCNC: 10.6 G/DL (ref 13.5–17.5)
IMM GRANULOCYTES # BLD AUTO: 0.21 K/MM3 (ref 0–0.1)
IMM GRANULOCYTES NFR BLD AUTO: 2 % (ref 0–1)
LYMPHOCYTES # BLD AUTO: 1.48 K/MM3 (ref 0.84–5.2)
LYMPHOCYTES NFR BLD AUTO: 16 % (ref 21–46)
MCHC RBC AUTO-ENTMCNC: 30.5 G/DL (ref 31.5–36.5)
MCV RBC AUTO: 80 FL (ref 80–100)
MONOCYTES # BLD AUTO: 0.66 K/MM3 (ref 0.16–1.47)
MONOCYTES NFR BLD AUTO: 7 % (ref 4–13)
NEUTROPHILS # BLD AUTO: 6.76 K/MM3 (ref 1.96–9.15)
NEUTROPHILS NFR BLD AUTO: 71 % (ref 41–73)
NRBC # BLD AUTO: 0 K/MM3 (ref 0–0.02)
NRBC BLD AUTO-RTO: 0 /100 WBC (ref 0–0.2)
PLATELET # BLD AUTO: 228 K/MM3 (ref 150–400)
POTASSIUM SERPL-SCNC: 4.3 MMOL/L (ref 3.5–5.5)
SODIUM SERPL-SCNC: 138 MMOL/L (ref 136–145)

## 2023-01-09 NOTE — NUR
PATIENT ON BED REST WITH MULTIPLE ABX RUNNING THROUGH OUT THE DAY. TOLERATING
WELL. COGNITION APPEARS CLEAR. DENIES FURTHER HALLUCINATION. PRN PAIN
MEDICATION GIVEN X 2 THIS SHIFT. PATIENT IS AFIB ON TELE, TELE TECH REPORTED
MANY PVC AS WELL. PATIENT IS EATING MEALS. WOUND CARE DONE ON LOWER
EXTREMITY-DRESSING CHANGE. COCCYX DRESSING CLEAN DRY INTACT CHANGED JUST
BEFORE SHIFT CHANGE THIS AM.

## 2023-01-09 NOTE — NUR
SHIFT SUMMARY;
 
NO ACUTE CHANGES OVERNIGHT. PT SLEPT MOST OF THE SHIFT. PT REQUESTED PRN PAIN
MEDICATIONS T/O THE NIGHT. CURRENTLY THE PT IS RESTING IN BED WITH THE BED IN
THE LOWEST POSITION AND THE CALL LIGHT AT BEDSIDE. TELE IN PLACE, A-'S.

## 2023-01-10 NOTE — NUR
PATIENT REPORTED FEELING WORSE THIS AM, THAN HE HAD YESTERDAY. HE C/O CHEST
PAIN IN THE RIGHT SIDE OF HIS CHEST WHICH PROVIDER THINKS IS MUSCULOSKELETAL
PAIN. PATIENT WILL BE GOING TO [facility] ([facility]TOMORROW. THE DISCHARGE WAS
SCHEDULED TO HAPPEN TODAY, BUT WAS AUTHORIZED FOR TOMORROW. PATIENT IS STILL
RECEIVING IV ABX BACK TO BACK, AND IS TAKING A PAIN PILL EVERY FOUR HOURS. THE
PAIN PILL IS EFFECTIVE BUT TAKES A LONG TIME TO START WORKING. HE SLEEPS IN
BETWEEN. DRESSING CHANGE ON BUTTOCKS WAS DONE TODAY. AREA IS DEEP BUT
SURROUNDING TISSUE IS GOOD COLOR, NOT TO RED AND PATIENT DENIES PAIN DURING
CHANGE. OTHER MEPILEX ON LE ARE DRY AND INTACT. COGNITION IS WNL AND PATIENT
HAS NO HALLUCINATION.

## 2023-01-10 NOTE — NUR
SHIFT SUMMARY:
Pt A/Ox4 and call light appropriate. No new complaints this shift. He did have
pain, recieved PRN pain medications [id number]. He was repostioned throughout the
night as tolerated. He remained contienent this shift. Denied nauea, SOB and
dizziness.

## 2023-01-10 NOTE — NUR
PATEINT REPORTS CHEST DISCOMFORT. HE DID EXPRESS THIS EARLIER WHEN DR. RODRIGUEZ
WAS PRESENT. HE STATES IT IS LIKE INDIGESTION, BUT ALSO CALLS IT A SHARP PAIN
IN RIGHT SIDE OF CHEST.
HEART SOUNDS ASSESSED. SOUNDS IRREGULAR-AFIB.
TELE TECH WAS CALLED AND REPORTS HIS NORMAL AFIB WITH ABOUT 8 PVC PER MINUTE
WHICH IS A LITTLE MORE THAN HIS USUAL 5-6 PVC PER MINUTE.
PATIENT IS RESTING IN BED. IV MERREM JUST FINISHED UP. PAIN MEDICATION GIVEN
CLOSE TO 8AM. ALL OTHER MORNING MEDS HAVE BEEN GIVEN.
DENIES SOB.

## 2023-01-11 LAB
FLUAV RNA SPEC QL NAA+PROBE: NEGATIVE
FLUBV RNA SPEC QL NAA+PROBE: NEGATIVE
RSV RNA SPEC QL NAA+PROBE: NEGATIVE
SARS-COV-2 RNA RESP QL NAA+PROBE: NEGATIVE

## 2023-01-11 NOTE — NUR
SHIFT SUMMARY:
PT A/OX4 AND CALL LIGHT APPROPRIATE. OVERNIGHT PT HAD C/O PAIN IN HIS LEGS,
AND COCCYX. PRN PAIN MEDICATIONS GIVEN Q4. HE DENIED NAUSEA, SOB, DIZZINESS.
ABX GIVEN PER EMAR. POSSIBLE D/C SET FOR TODAY FOR A REHAB FACILITY.